# Patient Record
Sex: FEMALE | Race: WHITE | Employment: FULL TIME | URBAN - METROPOLITAN AREA
[De-identification: names, ages, dates, MRNs, and addresses within clinical notes are randomized per-mention and may not be internally consistent; named-entity substitution may affect disease eponyms.]

---

## 2017-11-06 PROCEDURE — 88175 CYTOPATH C/V AUTO FLUID REDO: CPT | Performed by: OBSTETRICS & GYNECOLOGY

## 2017-11-06 PROCEDURE — 87624 HPV HI-RISK TYP POOLED RSLT: CPT | Performed by: OBSTETRICS & GYNECOLOGY

## 2018-07-13 PROBLEM — J30.89 NON-SEASONAL ALLERGIC RHINITIS, UNSPECIFIED TRIGGER: Status: ACTIVE | Noted: 2018-07-13

## 2019-06-27 PROBLEM — M25.561 CHRONIC PAIN OF BOTH KNEES: Status: ACTIVE | Noted: 2019-06-27

## 2019-06-27 PROBLEM — M76.31 ILIOTIBIAL BAND SYNDROME OF RIGHT SIDE: Status: ACTIVE | Noted: 2019-06-27

## 2019-06-27 PROBLEM — G89.29 CHRONIC PAIN OF BOTH KNEES: Status: ACTIVE | Noted: 2019-06-27

## 2019-06-27 PROBLEM — M25.562 CHRONIC PAIN OF BOTH KNEES: Status: ACTIVE | Noted: 2019-06-27

## 2020-02-04 PROBLEM — D69.6 THROMBOCYTOPENIA: Status: ACTIVE | Noted: 2020-02-04

## 2020-02-04 PROBLEM — D69.6 THROMBOCYTOPENIA (HCC): Status: ACTIVE | Noted: 2020-02-04

## 2020-03-02 PROBLEM — O09.529 AMA (ADVANCED MATERNAL AGE) MULTIGRAVIDA 35+ (HCC): Status: ACTIVE | Noted: 2020-03-02

## 2020-03-02 PROBLEM — O09.529 AMA (ADVANCED MATERNAL AGE) MULTIGRAVIDA 35+: Status: ACTIVE | Noted: 2020-03-02

## 2020-05-06 NOTE — PROGRESS NOTES
Outpatient Maternal-Fetal Medicine Consultation    Dear Dr. Arianna Collazo    Thank you for requesting ultrasound evaluation and maternal fetal medicine consultation on your patient Singh Noble.   As you are aware she is a 47year old female [id number] with a si Take 1 capsule by mouth daily. , Disp: , Rfl:   •  Fexofenadine-Pseudoephed ER  MG Oral Tablet 12 Hr, Take 1 tablet by mouth 2 (two) times daily. , Disp: , Rfl:   •  Fluticasone Propionate 50 MCG/ACT Nasal Suspension, 2 sprays by Nasal route daily. Martha Bliss Amniotic fluid: normal. MVP 4.2 cm. Cord: normal.   Placenta: posterior. Fetal Anatomy:  Visualized with normal appearance: head, face, spine, neck, skin, chest, abdominal wall, gastrointestinal tract, kidneys, bladder, extremities, skeleton.     Br Routine obstetric care is more than adequate to assess for gestational diabetes and preeclampsia; hence, no further significant alterations in obstetric care are advised.     Fetal Aneuploidy    We also discussed the increased risk of chromosomal abnormalit 10:00 AM     07/12/2012 04:37 PM     12/03/2011 12:40 PM     Incidental thrombocytopenia of pregnancy, also termed gestational thrombocytopenia, is defined by the following five criteria:  Mild and asymptomatic thrombocytopenia   No past hist coordination of care. Our discussion is summarized above. The approximate physician face-to-face time was 40 minutes.

## 2020-05-07 ENCOUNTER — OFFICE VISIT (OUTPATIENT)
Dept: PERINATAL CARE | Facility: HOSPITAL | Age: 40
End: 2020-05-07
Attending: OBSTETRICS & GYNECOLOGY
Payer: COMMERCIAL

## 2020-05-07 VITALS
WEIGHT: 164 LBS | HEART RATE: 82 BPM | HEIGHT: 68 IN | DIASTOLIC BLOOD PRESSURE: 75 MMHG | BODY MASS INDEX: 24.86 KG/M2 | SYSTOLIC BLOOD PRESSURE: 122 MMHG

## 2020-05-07 DIAGNOSIS — D69.6 THROMBOCYTOPENIA (HCC): ICD-10-CM

## 2020-05-07 DIAGNOSIS — O09.521 MULTIGRAVIDA OF ADVANCED MATERNAL AGE IN FIRST TRIMESTER: ICD-10-CM

## 2020-05-07 PROCEDURE — 76811 OB US DETAILED SNGL FETUS: CPT | Performed by: OBSTETRICS & GYNECOLOGY

## 2020-05-07 PROCEDURE — 99243 OFF/OP CNSLTJ NEW/EST LOW 30: CPT | Performed by: OBSTETRICS & GYNECOLOGY

## 2020-07-23 ENCOUNTER — OFFICE VISIT (OUTPATIENT)
Dept: PERINATAL CARE | Facility: HOSPITAL | Age: 40
End: 2020-07-23
Attending: OBSTETRICS & GYNECOLOGY
Payer: COMMERCIAL

## 2020-07-23 VITALS
SYSTOLIC BLOOD PRESSURE: 112 MMHG | HEART RATE: 91 BPM | WEIGHT: 179 LBS | BODY MASS INDEX: 27 KG/M2 | DIASTOLIC BLOOD PRESSURE: 72 MMHG

## 2020-07-23 DIAGNOSIS — O09.523 MULTIGRAVIDA OF ADVANCED MATERNAL AGE IN THIRD TRIMESTER: ICD-10-CM

## 2020-07-23 DIAGNOSIS — D69.6 THROMBOCYTOPENIA (HCC): ICD-10-CM

## 2020-07-23 PROCEDURE — 99213 OFFICE O/P EST LOW 20 MIN: CPT | Performed by: OBSTETRICS & GYNECOLOGY

## 2020-07-23 PROCEDURE — 76816 OB US FOLLOW-UP PER FETUS: CPT | Performed by: OBSTETRICS & GYNECOLOGY

## 2020-07-23 NOTE — PROGRESS NOTES
Indication: Maternal age (47 years). ____________________________________________________________________________  History: Age: 47 years. Maternal age at [facility]: 47 years.  : 4 Para: 2.  _______________________________________________________________ (2). Score 8 / 8. Summary:  testing is reassuring.   We discussed fetal movement counts.    ____________________________________________________________________________  Comments:  Outpatient Maternal-Fetal Medicine Consultation    Dear Dr. Shweta Feliz capsule by mouth daily. , Disp: , Rfl:     Fexofenadine-Pseudoephed ER  MG Oral Tablet 12 Hr, Take 1 tablet by mouth 2 (two) times daily. , Disp: , Rfl:     Fluticasone Propionate 50 MCG/ACT Nasal Suspension, 2 sprays by Nasal route daily. , Disp: 3 Inh during a previous pregnancy)    Occurrence during late gestation    No association with fetal thrombocytopenia    Spontaneous resolution after delivery      IMPRESSION:  · IUP at 32w0d  · AMA: low-risk cell free fetal DNA, declined invasive testing  · Gest

## 2020-08-08 ENCOUNTER — HOSPITAL ENCOUNTER (OUTPATIENT)
Facility: HOSPITAL | Age: 40
Setting detail: OBSERVATION
Discharge: HOME | End: 2020-08-08
Attending: OBSTETRICS & GYNECOLOGY | Admitting: OBSTETRICS & GYNECOLOGY
Payer: COMMERCIAL

## 2020-08-08 VITALS
TEMPERATURE: 99 F | DIASTOLIC BLOOD PRESSURE: 72 MMHG | BODY MASS INDEX: 28.34 KG/M2 | RESPIRATION RATE: 18 BRPM | SYSTOLIC BLOOD PRESSURE: 118 MMHG | HEIGHT: 68 IN | WEIGHT: 187 LBS | HEART RATE: 95 BPM

## 2020-08-08 PROBLEM — Z34.90 PREGNANCY (HCC): Status: ACTIVE | Noted: 2020-08-08

## 2020-08-08 PROBLEM — Z34.90 PREGNANCY: Status: ACTIVE | Noted: 2020-08-08

## 2020-08-08 PROCEDURE — 99213 OFFICE O/P EST LOW 20 MIN: CPT

## 2020-08-08 PROCEDURE — 59025 FETAL NON-STRESS TEST: CPT

## 2020-08-08 NOTE — NST
Nonstress Test   Patient: Debra Dias    Gestation: 34w2d    NST:       Variability: Moderate           Accelerations: Yes           Decelerations: None            Baseline: 145 BPM           Uterine Irritability: No           Contractions: Bethena Benjamin

## 2020-08-08 NOTE — PROGRESS NOTES
Pt , edc 20 (34 2/7wks) admitted to triage rm 2 with c/o decreased fetal movement since yesterday morning. Pt states she overexerted herself yesterday and felt weak & sweaty.  Pt states she continues to feel fetal movement but definitely a lot less

## 2020-08-31 PROBLEM — O99.820 GBS (GROUP B STREPTOCOCCUS CARRIER), +RV CULTURE, CURRENTLY PREGNANT: Status: ACTIVE | Noted: 2020-08-31

## 2020-08-31 PROBLEM — O99.820 GBS (GROUP B STREPTOCOCCUS CARRIER), +RV CULTURE, CURRENTLY PREGNANT (HCC): Status: ACTIVE | Noted: 2020-08-31

## 2020-09-08 ENCOUNTER — TELEPHONE (OUTPATIENT)
Dept: OBGYN UNIT | Facility: HOSPITAL | Age: 40
End: 2020-09-08

## 2020-09-11 ENCOUNTER — APPOINTMENT (OUTPATIENT)
Dept: OBGYN CLINIC | Facility: HOSPITAL | Age: 40
End: 2020-09-11
Payer: COMMERCIAL

## 2020-09-11 ENCOUNTER — TELEPHONE (OUTPATIENT)
Dept: OBGYN UNIT | Facility: HOSPITAL | Age: 40
End: 2020-09-11

## 2020-09-11 ENCOUNTER — HOSPITAL ENCOUNTER (INPATIENT)
Facility: HOSPITAL | Age: 40
LOS: 2 days | Discharge: HOME | End: 2020-09-13
Attending: OBSTETRICS & GYNECOLOGY | Admitting: OBSTETRICS & GYNECOLOGY
Payer: COMMERCIAL

## 2020-09-11 ENCOUNTER — ANESTHESIA (OUTPATIENT)
Dept: OBGYN UNIT | Facility: HOSPITAL | Age: 40
End: 2020-09-11
Payer: COMMERCIAL

## 2020-09-11 ENCOUNTER — ANESTHESIA EVENT (OUTPATIENT)
Dept: OBGYN UNIT | Facility: HOSPITAL | Age: 40
End: 2020-09-11
Payer: COMMERCIAL

## 2020-09-11 LAB
ANTIBODY SCREEN: NEGATIVE
BASOPHILS # BLD AUTO: 0.01 X10(3) UL (ref 0–0.2)
BASOPHILS NFR BLD AUTO: 0.2 %
DEPRECATED RDW RBC AUTO: 41.9 FL (ref 35.1–46.3)
EOSINOPHIL # BLD AUTO: 0.06 X10(3) UL (ref 0–0.7)
EOSINOPHIL NFR BLD AUTO: 1 %
ERYTHROCYTE [DISTWIDTH] IN BLOOD BY AUTOMATED COUNT: 12.9 % (ref 11–15)
HCT VFR BLD AUTO: 34.4 % (ref 35–48)
HGB BLD-MCNC: 11.6 G/DL (ref 12–16)
IMM GRANULOCYTES # BLD AUTO: 0.04 X10(3) UL (ref 0–1)
IMM GRANULOCYTES NFR BLD: 0.7 %
LYMPHOCYTES # BLD AUTO: 0.89 X10(3) UL (ref 1–4)
LYMPHOCYTES NFR BLD AUTO: 14.6 %
MCH RBC QN AUTO: 30.2 PG (ref 26–34)
MCHC RBC AUTO-ENTMCNC: 33.7 G/DL (ref 31–37)
MCV RBC AUTO: 89.6 FL (ref 80–100)
MONOCYTES # BLD AUTO: 0.44 X10(3) UL (ref 0.1–1)
MONOCYTES NFR BLD AUTO: 7.2 %
NEUTROPHILS # BLD AUTO: 4.66 X10 (3) UL (ref 1.5–7.7)
NEUTROPHILS # BLD AUTO: 4.66 X10(3) UL (ref 1.5–7.7)
NEUTROPHILS NFR BLD AUTO: 76.3 %
PLATELET # BLD AUTO: 129 10(3)UL (ref 150–450)
RBC # BLD AUTO: 3.84 X10(6)UL (ref 3.8–5.3)
RH BLOOD TYPE: POSITIVE
SARS-COV-2 RNA RESP QL NAA+PROBE: NOT DETECTED
T PALLIDUM AB SER QL IA: NONREACTIVE
WBC # BLD AUTO: 6.1 X10(3) UL (ref 4–11)

## 2020-09-11 PROCEDURE — 86901 BLOOD TYPING SEROLOGIC RH(D): CPT | Performed by: OBSTETRICS & GYNECOLOGY

## 2020-09-11 PROCEDURE — 86780 TREPONEMA PALLIDUM: CPT | Performed by: OBSTETRICS & GYNECOLOGY

## 2020-09-11 PROCEDURE — 0HQ9XZZ REPAIR PERINEUM SKIN, EXTERNAL APPROACH: ICD-10-PCS | Performed by: OBSTETRICS & GYNECOLOGY

## 2020-09-11 PROCEDURE — 85025 COMPLETE CBC W/AUTO DIFF WBC: CPT | Performed by: OBSTETRICS & GYNECOLOGY

## 2020-09-11 PROCEDURE — 3E033VJ INTRODUCTION OF OTHER HORMONE INTO PERIPHERAL VEIN, PERCUTANEOUS APPROACH: ICD-10-PCS | Performed by: OBSTETRICS & GYNECOLOGY

## 2020-09-11 PROCEDURE — 86900 BLOOD TYPING SEROLOGIC ABO: CPT | Performed by: OBSTETRICS & GYNECOLOGY

## 2020-09-11 PROCEDURE — 86850 RBC ANTIBODY SCREEN: CPT | Performed by: OBSTETRICS & GYNECOLOGY

## 2020-09-11 RX ORDER — IBUPROFEN 600 MG/1
600 TABLET ORAL EVERY 6 HOURS
Status: DISCONTINUED | OUTPATIENT
Start: 2020-09-11 | End: 2020-09-13

## 2020-09-11 RX ORDER — ONDANSETRON 2 MG/ML
4 INJECTION INTRAMUSCULAR; INTRAVENOUS EVERY 6 HOURS PRN
Status: DISCONTINUED | OUTPATIENT
Start: 2020-09-11 | End: 2020-09-11

## 2020-09-11 RX ORDER — IBUPROFEN 600 MG/1
600 TABLET ORAL EVERY 6 HOURS PRN
Status: DISCONTINUED | OUTPATIENT
Start: 2020-09-11 | End: 2020-09-11

## 2020-09-11 RX ORDER — SODIUM CHLORIDE, SODIUM LACTATE, POTASSIUM CHLORIDE, CALCIUM CHLORIDE 600; 310; 30; 20 MG/100ML; MG/100ML; MG/100ML; MG/100ML
INJECTION, SOLUTION INTRAVENOUS CONTINUOUS
Status: DISCONTINUED | OUTPATIENT
Start: 2020-09-11 | End: 2020-09-11

## 2020-09-11 RX ORDER — DOCUSATE SODIUM 100 MG/1
100 CAPSULE, LIQUID FILLED ORAL
Status: DISCONTINUED | OUTPATIENT
Start: 2020-09-11 | End: 2020-09-13

## 2020-09-11 RX ORDER — ACETAMINOPHEN 325 MG/1
650 TABLET ORAL EVERY 6 HOURS PRN
Status: DISCONTINUED | OUTPATIENT
Start: 2020-09-11 | End: 2020-09-13

## 2020-09-11 RX ORDER — DEXTROSE, SODIUM CHLORIDE, SODIUM LACTATE, POTASSIUM CHLORIDE, AND CALCIUM CHLORIDE 5; .6; .31; .03; .02 G/100ML; G/100ML; G/100ML; G/100ML; G/100ML
INJECTION, SOLUTION INTRAVENOUS AS NEEDED
Status: DISCONTINUED | OUTPATIENT
Start: 2020-09-11 | End: 2020-09-11

## 2020-09-11 RX ORDER — TRISODIUM CITRATE DIHYDRATE AND CITRIC ACID MONOHYDRATE 500; 334 MG/5ML; MG/5ML
30 SOLUTION ORAL AS NEEDED
Status: DISCONTINUED | OUTPATIENT
Start: 2020-09-11 | End: 2020-09-11

## 2020-09-11 RX ORDER — CLINDAMYCIN PHOSPHATE 900 MG/50ML
900 INJECTION INTRAVENOUS EVERY 8 HOURS
Status: DISCONTINUED | OUTPATIENT
Start: 2020-09-11 | End: 2020-09-11

## 2020-09-11 RX ORDER — ACETAMINOPHEN 500 MG
500 TABLET ORAL EVERY 6 HOURS PRN
Status: DISCONTINUED | OUTPATIENT
Start: 2020-09-11 | End: 2020-09-11

## 2020-09-11 RX ORDER — AMMONIA INHALANTS 0.04 G/.3ML
0.3 INHALANT RESPIRATORY (INHALATION) AS NEEDED
Status: DISCONTINUED | OUTPATIENT
Start: 2020-09-11 | End: 2020-09-11

## 2020-09-11 RX ORDER — SIMETHICONE 80 MG
80 TABLET,CHEWABLE ORAL 3 TIMES DAILY PRN
Status: DISCONTINUED | OUTPATIENT
Start: 2020-09-11 | End: 2020-09-13

## 2020-09-11 RX ORDER — EPHEDRINE SULFATE/0.9% NACL/PF 25 MG/5 ML
5 SYRINGE (ML) INTRAVENOUS AS NEEDED
Status: DISCONTINUED | OUTPATIENT
Start: 2020-09-11 | End: 2020-09-11

## 2020-09-11 RX ORDER — TERBUTALINE SULFATE 1 MG/ML
0.25 INJECTION, SOLUTION SUBCUTANEOUS AS NEEDED
Status: DISCONTINUED | OUTPATIENT
Start: 2020-09-11 | End: 2020-09-11

## 2020-09-11 RX ORDER — DIPHENHYDRAMINE HYDROCHLORIDE 50 MG/ML
12.5 INJECTION INTRAMUSCULAR; INTRAVENOUS EVERY 4 HOURS PRN
Status: DISCONTINUED | OUTPATIENT
Start: 2020-09-11 | End: 2020-09-11

## 2020-09-11 RX ORDER — BISACODYL 10 MG
10 SUPPOSITORY, RECTAL RECTAL ONCE AS NEEDED
Status: DISCONTINUED | OUTPATIENT
Start: 2020-09-11 | End: 2020-09-13

## 2020-09-11 NOTE — ANESTHESIA PREPROCEDURE EVALUATION
PRE-OP EVALUATION    Patient Name: Deana Marc    Pre-op Diagnosis: * No pre-op diagnosis entered *    * No procedures listed *    * No surgeons found in log *    Pre-op vitals reviewed.   Temp: 98 °F (36.7 °C)  Pulse: 89  Resp: 16  BP: 131/79  SpO2: Clavulanate]; Biaxin [Clarithromycin]      Anesthesia Evaluation        Anesthetic Complications  (-) history of anesthetic complications         GI/Hepatic/Renal    Negative GI/hepatic/renal ROS.                              Cardiovascular    Negative card epidural           Comment: Plan for epidural for labor analgesia. Risks of infection, bleeding, post dural puncture headache, nerve injury, and inadequate analgesia requiring epidural placement discussed.  Pt voiced understanding and wishes to proceed  Nette

## 2020-09-11 NOTE — H&P
[address] and Delivery Prenatal History and Physical Interval Addendum  Please see full Prenatal Record for this pregnancy      SUBJECTIVE:    Interval History:      This is a pregnancy at 44 1/7 weeks admitted for Elective IOL 39 + weeks with

## 2020-09-11 NOTE — ANESTHESIA PROCEDURE NOTES
Labor Analgesia  Performed by: Merle Smith MD  Authorized by: Merle Smith MD       General Information and Staff    Start Time:  9/11/2020 4:18 PM  End Time:  9/11/2020 4:29 PM  Anesthesiologist:  Merle Smith MD  Performed

## 2020-09-11 NOTE — PROGRESS NOTES
Pt is a 47year old female admitted to 105/105-A. Patient presents with:  Scheduled Induction     Pt is [id number] 39w1d intra-uterine pregnancy. History obtained, consents signed. Oriented to room, staff, and plan of care.

## 2020-09-12 LAB
BASOPHILS # BLD AUTO: 0.02 X10(3) UL (ref 0–0.2)
BASOPHILS NFR BLD AUTO: 0.3 %
DEPRECATED RDW RBC AUTO: 44.5 FL (ref 35.1–46.3)
EOSINOPHIL # BLD AUTO: 0.03 X10(3) UL (ref 0–0.7)
EOSINOPHIL NFR BLD AUTO: 0.4 %
ERYTHROCYTE [DISTWIDTH] IN BLOOD BY AUTOMATED COUNT: 12.9 % (ref 11–15)
HCT VFR BLD AUTO: 33.5 % (ref 35–48)
HGB BLD-MCNC: 10.9 G/DL (ref 12–16)
IMM GRANULOCYTES # BLD AUTO: 0.06 X10(3) UL (ref 0–1)
IMM GRANULOCYTES NFR BLD: 0.8 %
LYMPHOCYTES # BLD AUTO: 0.91 X10(3) UL (ref 1–4)
LYMPHOCYTES NFR BLD AUTO: 12.6 %
MCH RBC QN AUTO: 30.8 PG (ref 26–34)
MCHC RBC AUTO-ENTMCNC: 32.5 G/DL (ref 31–37)
MCV RBC AUTO: 94.6 FL (ref 80–100)
MONOCYTES # BLD AUTO: 0.44 X10(3) UL (ref 0.1–1)
MONOCYTES NFR BLD AUTO: 6.1 %
NEUTROPHILS # BLD AUTO: 5.78 X10 (3) UL (ref 1.5–7.7)
NEUTROPHILS # BLD AUTO: 5.78 X10(3) UL (ref 1.5–7.7)
NEUTROPHILS NFR BLD AUTO: 79.8 %
PLATELET # BLD AUTO: 130 10(3)UL (ref 150–450)
RBC # BLD AUTO: 3.54 X10(6)UL (ref 3.8–5.3)
WBC # BLD AUTO: 7.2 X10(3) UL (ref 4–11)

## 2020-09-12 PROCEDURE — 85025 COMPLETE CBC W/AUTO DIFF WBC: CPT | Performed by: OBSTETRICS & GYNECOLOGY

## 2020-09-12 NOTE — PROGRESS NOTES
NURSING ADMISSION NOTE      Patient admitted via Wheelchair  Oriented to room. Safety precautions initiated. Bed in low position. Call light in reach.   Identification of  verified with parents and RNx2  Hugs/kisses in place  Discharge folder

## 2020-09-12 NOTE — PROGRESS NOTES
S: pt without complaints.   Lochia light  O: VS-Temp:  [97.9 °F (36.6 °C)-98.2 °F (36.8 °C)] 98.2 °F (36.8 °C)  Pulse:  [56-89] 70  Resp:  [16-20] 16  BP: (0-153)/(0-98) 109/58       Abdomen- soft ND NT, uterus firm and contracted       Extremities- tr ara

## 2020-09-12 NOTE — PLAN OF CARE
Problem: Patient/Family Goals  Goal: Patient/Family Long Term Goal  Description  Patient's Long Term Goal:   Uncomplicated vaginal delivery    Interventions:  VS per protocol  I&O  Ice chips and sips as tolerated  EFM per protocol  Maintain IV as ordered ice/sitz baths for perineum discomfort. - Monitor healing of incision/episiotomy/laceration, and assess for signs and symptoms of infection and hematoma.   - Assess bladder function and monitor for bladder distention.  - Provide/instruct/assist with perica and assistance, as needed. - Encourage rooming-in and breast feeding on demand.  - Encourage skin-to-skin contact. - Provide LC support as needed. - Assess for and manage engorgement.   - Provide breast feeding education handouts and information on commu

## 2020-09-12 NOTE — PROGRESS NOTES
BRP with assist, tolerated well, denies urge to void. pericare done. Pt transferred per wheelchair with baby in arms. Report given to [facility]. Proper ID done with two RNs.

## 2020-09-12 NOTE — PLAN OF CARE
Problem: Patient/Family Goals  Goal: Patient/Family Long Term Goal  Description  Patient's Long Term Goal:   Uncomplicated vaginal delivery    Interventions:  VS per protocol  I&O  Ice chips and sips as tolerated  EFM per protocol  Maintain IV as ordered ANXIETY  Goal: Will report anxiety at manageable levels  Description  INTERVENTIONS:  - Administer medication as ordered  - Teach and rehearse alternative coping skills  - Provide emotional support with 1:1 interaction with staff  Outcome: Progressing actively listen. - Educate father/SO about benefits of breast feeding and how to manage common lactation challenges.   - Recommend avoidance of specific medications or substances incompatible with breast feeding.  - Assess and monitor for signs of nipple p

## 2020-09-12 NOTE — PROGRESS NOTES
Labor Analgesia Follow Up Note    Patient underwent epidural anesthesia for labor analgesia,    Placenta Date/Time: 9/11/2020  7:47 PM    Delivery Date/Time[de-identified] 9/11/2020  7:45 PM    /65 (BP Location: Left arm)   Pulse 73   Temp 97.7 °F (36.5 °C) (Oral

## 2020-09-12 NOTE — L&D DELIVERY NOTE
Eldridge Williams [[id number]]    Labor Events     labor?:  No   steroids?:  None  Antibiotics received during labor?:  Yes  Antibiotics (enter # doses in comment):  other  Rupture date/time:  2020 1340     Rupture type:  AROM  Fluid color:

## 2020-09-13 VITALS
TEMPERATURE: 99 F | HEART RATE: 63 BPM | OXYGEN SATURATION: 100 % | WEIGHT: 203 LBS | HEIGHT: 68 IN | RESPIRATION RATE: 16 BRPM | BODY MASS INDEX: 30.77 KG/M2 | DIASTOLIC BLOOD PRESSURE: 70 MMHG | SYSTOLIC BLOOD PRESSURE: 121 MMHG

## 2020-09-13 PROBLEM — O99.820 GBS (GROUP B STREPTOCOCCUS CARRIER), +RV CULTURE, CURRENTLY PREGNANT: Status: RESOLVED | Noted: 2020-08-31 | Resolved: 2020-09-13

## 2020-09-13 PROBLEM — O99.820 GBS (GROUP B STREPTOCOCCUS CARRIER), +RV CULTURE, CURRENTLY PREGNANT (HCC): Status: RESOLVED | Noted: 2020-08-31 | Resolved: 2020-09-13

## 2020-09-13 PROBLEM — Z34.90 PREGNANCY: Status: RESOLVED | Noted: 2020-08-08 | Resolved: 2020-09-13

## 2020-09-13 PROBLEM — O09.529 AMA (ADVANCED MATERNAL AGE) MULTIGRAVIDA 35+: Status: RESOLVED | Noted: 2020-03-02 | Resolved: 2020-09-13

## 2020-09-13 PROBLEM — O09.529 AMA (ADVANCED MATERNAL AGE) MULTIGRAVIDA 35+ (HCC): Status: RESOLVED | Noted: 2020-03-02 | Resolved: 2020-09-13

## 2020-09-13 PROBLEM — Z34.90 PREGNANCY (HCC): Status: RESOLVED | Noted: 2020-08-08 | Resolved: 2020-09-13

## 2020-09-13 NOTE — PROGRESS NOTES
S: pt without complaints.   Lochia light  O: VS-Temp:  [98 °F (36.7 °C)] 98 °F (36.7 °C)  Pulse:  [63-64] 63  Resp:  [16] 16  BP: (116-121)/(70) 121/70       Abdomen- soft ND NT, uterus firm and contracted       Extremities- 1+ edema, NT bilateral lower ext

## 2020-09-15 ENCOUNTER — TELEPHONE (OUTPATIENT)
Dept: OBGYN UNIT | Facility: HOSPITAL | Age: 40
End: 2020-09-15

## 2020-09-17 ENCOUNTER — APPOINTMENT (OUTPATIENT)
Dept: CT IMAGING | Facility: HOSPITAL | Age: 40
DRG: 776 | End: 2020-09-17
Attending: EMERGENCY MEDICINE
Payer: COMMERCIAL

## 2020-09-17 ENCOUNTER — HOSPITAL ENCOUNTER (INPATIENT)
Facility: HOSPITAL | Age: 40
LOS: 2 days | Discharge: HOME | DRG: 776 | End: 2020-09-19
Attending: EMERGENCY MEDICINE | Admitting: OBSTETRICS & GYNECOLOGY
Payer: COMMERCIAL

## 2020-09-17 ENCOUNTER — APPOINTMENT (OUTPATIENT)
Dept: GENERAL RADIOLOGY | Facility: HOSPITAL | Age: 40
DRG: 776 | End: 2020-09-17
Attending: EMERGENCY MEDICINE
Payer: COMMERCIAL

## 2020-09-17 DIAGNOSIS — R06.00 DYSPNEA, UNSPECIFIED TYPE: ICD-10-CM

## 2020-09-17 DIAGNOSIS — R77.8 ELEVATED TROPONIN: ICD-10-CM

## 2020-09-17 DIAGNOSIS — O15.00: Primary | ICD-10-CM

## 2020-09-17 PROBLEM — Z34.90 PREGNANCY: Status: ACTIVE | Noted: 2020-09-17

## 2020-09-17 PROBLEM — Z34.90 PREGNANCY (HCC): Status: ACTIVE | Noted: 2020-09-17

## 2020-09-17 LAB
ALBUMIN SERPL-MCNC: 3 G/DL (ref 3.4–5)
ALBUMIN/GLOB SERPL: 0.9 {RATIO} (ref 1–2)
ALP LIVER SERPL-CCNC: 156 U/L (ref 37–98)
ALT SERPL-CCNC: 84 U/L (ref 13–56)
ANION GAP SERPL CALC-SCNC: 6 MMOL/L (ref 0–18)
APTT PPP: 28.6 SECONDS (ref 25.4–36.1)
AST SERPL-CCNC: 52 U/L (ref 15–37)
BASOPHILS # BLD AUTO: 0.03 X10(3) UL (ref 0–0.2)
BASOPHILS NFR BLD AUTO: 0.5 %
BILIRUB SERPL-MCNC: 0.4 MG/DL (ref 0.1–2)
BILIRUB UR QL STRIP.AUTO: NEGATIVE
BUN BLD-MCNC: 15 MG/DL (ref 7–18)
BUN/CREAT SERPL: 16.9 (ref 10–20)
CALCIUM BLD-MCNC: 9.2 MG/DL (ref 8.5–10.1)
CHLORIDE SERPL-SCNC: 108 MMOL/L (ref 98–112)
CLARITY UR REFRACT.AUTO: CLEAR
CO2 SERPL-SCNC: 27 MMOL/L (ref 21–32)
CREAT BLD-MCNC: 0.89 MG/DL (ref 0.55–1.02)
DEPRECATED RDW RBC AUTO: 43.2 FL (ref 35.1–46.3)
EOSINOPHIL # BLD AUTO: 0.06 X10(3) UL (ref 0–0.7)
EOSINOPHIL NFR BLD AUTO: 0.9 %
ERYTHROCYTE [DISTWIDTH] IN BLOOD BY AUTOMATED COUNT: 12.6 % (ref 11–15)
GLOBULIN PLAS-MCNC: 3.5 G/DL (ref 2.8–4.4)
GLUCOSE BLD-MCNC: 84 MG/DL (ref 70–99)
GLUCOSE UR STRIP.AUTO-MCNC: NEGATIVE MG/DL
HCT VFR BLD AUTO: 37.8 % (ref 35–48)
HGB BLD-MCNC: 12.4 G/DL (ref 12–16)
IMM GRANULOCYTES # BLD AUTO: 0.06 X10(3) UL (ref 0–1)
IMM GRANULOCYTES NFR BLD: 0.9 %
INR BLD: 0.94 (ref 0.89–1.11)
KETONES UR STRIP.AUTO-MCNC: NEGATIVE MG/DL
LEUKOCYTE ESTERASE UR QL STRIP.AUTO: NEGATIVE
LYMPHOCYTES # BLD AUTO: 0.9 X10(3) UL (ref 1–4)
LYMPHOCYTES NFR BLD AUTO: 13.7 %
M PROTEIN MFR SERPL ELPH: 6.5 G/DL (ref 6.4–8.2)
MCH RBC QN AUTO: 30.5 PG (ref 26–34)
MCHC RBC AUTO-ENTMCNC: 32.8 G/DL (ref 31–37)
MCV RBC AUTO: 93.1 FL (ref 80–100)
MONOCYTES # BLD AUTO: 0.36 X10(3) UL (ref 0.1–1)
MONOCYTES NFR BLD AUTO: 5.5 %
NEUTROPHILS # BLD AUTO: 5.16 X10 (3) UL (ref 1.5–7.7)
NEUTROPHILS # BLD AUTO: 5.16 X10(3) UL (ref 1.5–7.7)
NEUTROPHILS NFR BLD AUTO: 78.5 %
NITRITE UR QL STRIP.AUTO: NEGATIVE
NT-PROBNP SERPL-MCNC: 3722 PG/ML (ref ?–125)
OSMOLALITY SERPL CALC.SUM OF ELEC: 292 MOSM/KG (ref 275–295)
PH UR STRIP.AUTO: 8 [PH] (ref 4.5–8)
PLATELET # BLD AUTO: 204 10(3)UL (ref 150–450)
POTASSIUM SERPL-SCNC: 3.6 MMOL/L (ref 3.5–5.1)
PROT UR STRIP.AUTO-MCNC: NEGATIVE MG/DL
PSA SERPL DL<=0.01 NG/ML-MCNC: 12.9 SECONDS (ref 12.4–14.6)
RBC # BLD AUTO: 4.06 X10(6)UL (ref 3.8–5.3)
SARS-COV-2 BY PCR: NEGATIVE
SODIUM SERPL-SCNC: 141 MMOL/L (ref 136–145)
SP GR UR STRIP.AUTO: <1.005 (ref 1–1.03)
TROPONIN I SERPL-MCNC: 0.24 NG/ML (ref ?–0.04)
URATE UR-MCNC: 12.1 MG/DL
UROBILINOGEN UR STRIP.AUTO-MCNC: <2 MG/DL
WBC # BLD AUTO: 6.6 X10(3) UL (ref 4–11)

## 2020-09-17 PROCEDURE — 93010 ELECTROCARDIOGRAM REPORT: CPT

## 2020-09-17 PROCEDURE — 71275 CT ANGIOGRAPHY CHEST: CPT | Performed by: EMERGENCY MEDICINE

## 2020-09-17 PROCEDURE — 80053 COMPREHEN METABOLIC PANEL: CPT | Performed by: EMERGENCY MEDICINE

## 2020-09-17 PROCEDURE — 99285 EMERGENCY DEPT VISIT HI MDM: CPT

## 2020-09-17 PROCEDURE — 70450 CT HEAD/BRAIN W/O DYE: CPT | Performed by: EMERGENCY MEDICINE

## 2020-09-17 PROCEDURE — 85025 COMPLETE CBC W/AUTO DIFF WBC: CPT | Performed by: EMERGENCY MEDICINE

## 2020-09-17 PROCEDURE — 85610 PROTHROMBIN TIME: CPT | Performed by: EMERGENCY MEDICINE

## 2020-09-17 PROCEDURE — 81001 URINALYSIS AUTO W/SCOPE: CPT | Performed by: EMERGENCY MEDICINE

## 2020-09-17 PROCEDURE — 84484 ASSAY OF TROPONIN QUANT: CPT | Performed by: EMERGENCY MEDICINE

## 2020-09-17 PROCEDURE — 36415 COLL VENOUS BLD VENIPUNCTURE: CPT

## 2020-09-17 PROCEDURE — 84560 ASSAY OF URINE/URIC ACID: CPT | Performed by: EMERGENCY MEDICINE

## 2020-09-17 PROCEDURE — 71045 X-RAY EXAM CHEST 1 VIEW: CPT | Performed by: EMERGENCY MEDICINE

## 2020-09-17 PROCEDURE — 85730 THROMBOPLASTIN TIME PARTIAL: CPT | Performed by: EMERGENCY MEDICINE

## 2020-09-17 PROCEDURE — 83880 ASSAY OF NATRIURETIC PEPTIDE: CPT | Performed by: EMERGENCY MEDICINE

## 2020-09-17 PROCEDURE — 93005 ELECTROCARDIOGRAM TRACING: CPT

## 2020-09-17 RX ORDER — CALCIUM CARBONATE 200(500)MG
1000 TABLET,CHEWABLE ORAL
Status: DISCONTINUED | OUTPATIENT
Start: 2020-09-17 | End: 2020-09-19

## 2020-09-17 RX ORDER — ZOLPIDEM TARTRATE 5 MG/1
5 TABLET ORAL NIGHTLY PRN
Status: DISCONTINUED | OUTPATIENT
Start: 2020-09-17 | End: 2020-09-19

## 2020-09-17 RX ORDER — ACETAMINOPHEN 500 MG
500 TABLET ORAL EVERY 6 HOURS PRN
Status: DISCONTINUED | OUTPATIENT
Start: 2020-09-17 | End: 2020-09-17

## 2020-09-17 RX ORDER — FUROSEMIDE 10 MG/ML
20 INJECTION INTRAMUSCULAR; INTRAVENOUS ONCE
Status: COMPLETED | OUTPATIENT
Start: 2020-09-17 | End: 2020-09-17

## 2020-09-17 RX ORDER — ACETAMINOPHEN 500 MG
500 TABLET ORAL EVERY 6 HOURS PRN
Status: DISCONTINUED | OUTPATIENT
Start: 2020-09-17 | End: 2020-09-19

## 2020-09-17 RX ORDER — DOCUSATE SODIUM 100 MG/1
100 CAPSULE, LIQUID FILLED ORAL 2 TIMES DAILY
Status: DISCONTINUED | OUTPATIENT
Start: 2020-09-17 | End: 2020-09-19

## 2020-09-17 RX ORDER — SODIUM CHLORIDE, SODIUM LACTATE, POTASSIUM CHLORIDE, CALCIUM CHLORIDE 600; 310; 30; 20 MG/100ML; MG/100ML; MG/100ML; MG/100ML
INJECTION, SOLUTION INTRAVENOUS CONTINUOUS
Status: DISCONTINUED | OUTPATIENT
Start: 2020-09-17 | End: 2020-09-18

## 2020-09-17 RX ORDER — ZOLPIDEM TARTRATE 5 MG/1
5 TABLET ORAL NIGHTLY PRN
Status: DISCONTINUED | OUTPATIENT
Start: 2020-09-17 | End: 2020-09-17

## 2020-09-17 RX ORDER — CALCIUM GLUCONATE 94 MG/ML
1 INJECTION, SOLUTION INTRAVENOUS ONCE AS NEEDED
Status: DISCONTINUED | OUTPATIENT
Start: 2020-09-17 | End: 2020-09-17

## 2020-09-17 RX ORDER — ACETAMINOPHEN 500 MG
1000 TABLET ORAL EVERY 6 HOURS PRN
Status: DISCONTINUED | OUTPATIENT
Start: 2020-09-17 | End: 2020-09-17

## 2020-09-17 RX ORDER — CALCIUM CARBONATE 200(500)MG
1000 TABLET,CHEWABLE ORAL
Status: DISCONTINUED | OUTPATIENT
Start: 2020-09-17 | End: 2020-09-17

## 2020-09-17 RX ORDER — CALCIUM GLUCONATE 94 MG/ML
1 INJECTION, SOLUTION INTRAVENOUS ONCE AS NEEDED
Status: DISCONTINUED | OUTPATIENT
Start: 2020-09-17 | End: 2020-09-19

## 2020-09-17 RX ORDER — IBUPROFEN 400 MG/1
400 TABLET ORAL EVERY 6 HOURS PRN
Status: DISCONTINUED | OUTPATIENT
Start: 2020-09-17 | End: 2020-09-18

## 2020-09-17 RX ORDER — ACETAMINOPHEN 500 MG
1000 TABLET ORAL EVERY 6 HOURS PRN
Status: DISCONTINUED | OUTPATIENT
Start: 2020-09-17 | End: 2020-09-19

## 2020-09-17 RX ORDER — DOCUSATE SODIUM 100 MG/1
100 CAPSULE, LIQUID FILLED ORAL 2 TIMES DAILY
Status: DISCONTINUED | OUTPATIENT
Start: 2020-09-17 | End: 2020-09-17

## 2020-09-17 NOTE — H&P
SUBJECTIVE:  Reason for Admission: PP Pre eclampsia.   PAtient had uncomplicated  6891 no h/o PIH  Last night started with HA, continued today with Pedal edema, went to ER, elevated BP, HA and elevated LFT        prenatal multivitamin plus DHA 27 Social History  Social History    Tobacco Use      Smoking status: Former Smoker        Quit date: 1/3/2012        Years since quittin.7      Smokeless tobacco: Never Used    Alcohol use: Not Currently      Alcohol/week: 1.0 standard drinks      Ty

## 2020-09-17 NOTE — ED NOTES
Spoke with OB charge Rose Benitez.  Patient will go to CT and then directly to L and D post partum room 4623

## 2020-09-17 NOTE — ED INITIAL ASSESSMENT (HPI)
Delivered baby last friday. Monday feeling SOB, continues with movement and talking. COVID 19 test negative at . HTN at [address] [place] [address]. 158/87.  bringing patient in.

## 2020-09-18 LAB
ALBUMIN SERPL-MCNC: 3 G/DL (ref 3.4–5)
ALBUMIN/GLOB SERPL: 0.8 {RATIO} (ref 1–2)
ALP LIVER SERPL-CCNC: 170 U/L (ref 37–98)
ALT SERPL-CCNC: 75 U/L (ref 13–56)
ANION GAP SERPL CALC-SCNC: 4 MMOL/L (ref 0–18)
AST SERPL-CCNC: 35 U/L (ref 15–37)
ATRIAL RATE: 64 BPM
BASOPHILS # BLD AUTO: 0.03 X10(3) UL (ref 0–0.2)
BASOPHILS NFR BLD AUTO: 0.4 %
BILIRUB SERPL-MCNC: 0.5 MG/DL (ref 0.1–2)
BUN BLD-MCNC: 11 MG/DL (ref 7–18)
BUN/CREAT SERPL: 11 (ref 10–20)
CALCIUM BLD-MCNC: 7.5 MG/DL (ref 8.5–10.1)
CHLORIDE SERPL-SCNC: 100 MMOL/L (ref 98–112)
CO2 SERPL-SCNC: 33 MMOL/L (ref 21–32)
CREAT BLD-MCNC: 1 MG/DL (ref 0.55–1.02)
DEPRECATED RDW RBC AUTO: 43 FL (ref 35.1–46.3)
EOSINOPHIL # BLD AUTO: 0.05 X10(3) UL (ref 0–0.7)
EOSINOPHIL NFR BLD AUTO: 0.7 %
ERYTHROCYTE [DISTWIDTH] IN BLOOD BY AUTOMATED COUNT: 12.8 % (ref 11–15)
GLOBULIN PLAS-MCNC: 3.9 G/DL (ref 2.8–4.4)
GLUCOSE BLD-MCNC: 102 MG/DL (ref 70–99)
HCT VFR BLD AUTO: 43.5 % (ref 35–48)
HGB BLD-MCNC: 14.2 G/DL (ref 12–16)
IMM GRANULOCYTES # BLD AUTO: 0.03 X10(3) UL (ref 0–1)
IMM GRANULOCYTES NFR BLD: 0.4 %
LYMPHOCYTES # BLD AUTO: 0.74 X10(3) UL (ref 1–4)
LYMPHOCYTES NFR BLD AUTO: 10.5 %
M PROTEIN MFR SERPL ELPH: 6.9 G/DL (ref 6.4–8.2)
MCH RBC QN AUTO: 30 PG (ref 26–34)
MCHC RBC AUTO-ENTMCNC: 32.6 G/DL (ref 31–37)
MCV RBC AUTO: 91.8 FL (ref 80–100)
MONOCYTES # BLD AUTO: 0.53 X10(3) UL (ref 0.1–1)
MONOCYTES NFR BLD AUTO: 7.5 %
NEUTROPHILS # BLD AUTO: 5.7 X10 (3) UL (ref 1.5–7.7)
NEUTROPHILS # BLD AUTO: 5.7 X10(3) UL (ref 1.5–7.7)
NEUTROPHILS NFR BLD AUTO: 80.5 %
OSMOLALITY SERPL CALC.SUM OF ELEC: 284 MOSM/KG (ref 275–295)
P AXIS: 57 DEGREES
P-R INTERVAL: 120 MS
PLATELET # BLD AUTO: 235 10(3)UL (ref 150–450)
POTASSIUM SERPL-SCNC: 3.7 MMOL/L (ref 3.5–5.1)
Q-T INTERVAL: 454 MS
QRS DURATION: 80 MS
QTC CALCULATION (BEZET): 468 MS
R AXIS: 60 DEGREES
RBC # BLD AUTO: 4.74 X10(6)UL (ref 3.8–5.3)
SODIUM SERPL-SCNC: 137 MMOL/L (ref 136–145)
T AXIS: 50 DEGREES
URATE SERPL-MCNC: 6.4 MG/DL (ref 2.6–6)
VENTRICULAR RATE: 64 BPM
WBC # BLD AUTO: 7.1 X10(3) UL (ref 4–11)

## 2020-09-18 PROCEDURE — 85025 COMPLETE CBC W/AUTO DIFF WBC: CPT | Performed by: OBSTETRICS & GYNECOLOGY

## 2020-09-18 PROCEDURE — 84550 ASSAY OF BLOOD/URIC ACID: CPT | Performed by: OBSTETRICS & GYNECOLOGY

## 2020-09-18 PROCEDURE — 80053 COMPREHEN METABOLIC PANEL: CPT | Performed by: OBSTETRICS & GYNECOLOGY

## 2020-09-18 RX ORDER — IBUPROFEN 600 MG/1
600 TABLET ORAL EVERY 6 HOURS PRN
Status: DISCONTINUED | OUTPATIENT
Start: 2020-09-18 | End: 2020-09-19

## 2020-09-18 RX ORDER — LABETALOL 200 MG/1
200 TABLET, FILM COATED ORAL 2 TIMES DAILY
Status: DISCONTINUED | OUTPATIENT
Start: 2020-09-18 | End: 2020-09-18

## 2020-09-18 RX ORDER — HYDROCODONE BITARTRATE AND ACETAMINOPHEN 5; 325 MG/1; MG/1
1 TABLET ORAL EVERY 6 HOURS PRN
Status: DISCONTINUED | OUTPATIENT
Start: 2020-09-18 | End: 2020-09-19

## 2020-09-18 RX ORDER — LABETALOL 200 MG/1
200 TABLET, FILM COATED ORAL 2 TIMES DAILY
Status: DISCONTINUED | OUTPATIENT
Start: 2020-09-18 | End: 2020-09-19

## 2020-09-18 NOTE — PROGRESS NOTES
Pt up to bathroom with assist x 2. Pt tolerated activity without difficulty, discomfort or dyspnea. Logan dc'd, pericare completed. Scant dark brown discharge noted on peripad.

## 2020-09-18 NOTE — PLAN OF CARE
Problem: Patient/Family Goals  Goal: Patient/Family Short Term Goal  Description  Patient's Short Term Goal: maintain blood pressures    Interventions:   -   Problem: Patient/Family Goals  Goal: Patient/Family Long Term Goal  Description  Patient's Long

## 2020-09-18 NOTE — PAYOR COMM NOTE
--------------  ADMISSION REVIEW     Payor: KEVIN LOERA  Subscriber #:  [id number]  Authorization Number: [id number]    Admit date: 9/17/20  Admit time: 9452       Patient Seen in: [facility] 1sw-j    History   Patient presents with:  Dyspnea ANTHONY SOB Awake oriented ×3. Nonfocal.  Good strength throughout.   No meningismus    Labs Reviewed   COMP METABOLIC PANEL (14) - Abnormal; Notable for the following components:       Result Value    AST 52 (*)     ALT 84 (*)     Alkaline Phosphatase 156 (*)     Alb (primary encounter diagnosis)  Dyspnea, unspecified type  Elevated troponin         H&P         SUBJECTIVE:  Reason for Admission: PP Pre eclampsia.   PAtient had uncomplicated   no h/o PIH  Last night started with HA, continued today with Peda Component Value Date    WBC 6.6 09/17/2020    HGB 12.4 09/17/2020    HCT 37.8 09/17/2020    .0 09/17/2020    CREATSERUM 0.89 09/17/2020    BUN 15 09/17/2020     09/17/2020    K 3.6 09/17/2020     09/17/2020    CO2 27.0 09/17/2020    GL

## 2020-09-18 NOTE — ED PROVIDER NOTES
Patient Seen in: [facility] 1sw-j      History   Patient presents with:  Dyspnea ANTHONY SOB    Stated Complaint: Delivered baby last friday.  Monday feeling SOB, continues with movement and galo*    HPI    Patient is a 53-year-old woman who presents the Drug use: No             Review of Systems    Positive for stated complaint: Delivered baby last friday. Monday feeling SOB, continues with movement and galo*  Other systems are as noted in HPI. Constitutional and vital signs reviewed.       All other syste within normal limits   CBC W/ DIFFERENTIAL - Abnormal; Notable for the following components:    Lymphocyte Absolute 0.90 (*)     All other components within normal limits   PROTHROMBIN TIME (PT) - Normal   PTT, ACTIVATED - Normal   CBC WITH DIFFERENTIAL WI type  Elevated troponin    Disposition:  Send to l&d  9/17/2020  5:57 pm    Follow-up:  No follow-up provider specified.         Medications Prescribed:  Current Discharge Medication List

## 2020-09-18 NOTE — PROGRESS NOTES
Pt is a  at 6 days postpartum ( on ) who is admitted to room 1107 for postpartum magnesium sulfate. Pt presented to ER with new onset of HA, and SOB.  Increased Bp's and elevated liver enzymes were noted and after drawing labs and a CTscan, pt w

## 2020-09-18 NOTE — PROGRESS NOTES
OB/GYN    Pt without complaints. Headache resolved. Upset because baby not allowed to stay.       Pulse: 82  Resp: 15  BP: 126/82  abd  soft, NT, ND, fundus firm below umbilicus  extr  trace edema, no calf tenderness    Impression: Postpartum preeclampsia

## 2020-09-18 NOTE — PROGRESS NOTES
GIBBS resolved  LFT's improved but one still abnormal  [id number]    Plan Labetalol 200 now and bid  CMP in AM  Home tomorrow if BP under control and labs normalizing

## 2020-09-18 NOTE — PROGRESS NOTES
Pt resting in bed with infant in arms and  at bedside. POC discussed and questions answered. Dr. Steel Alpha on unit and aware of pt status.

## 2020-09-19 VITALS
DIASTOLIC BLOOD PRESSURE: 65 MMHG | RESPIRATION RATE: 16 BRPM | TEMPERATURE: 98 F | HEIGHT: 68 IN | WEIGHT: 190 LBS | OXYGEN SATURATION: 98 % | BODY MASS INDEX: 28.79 KG/M2 | HEART RATE: 96 BPM | SYSTOLIC BLOOD PRESSURE: 104 MMHG

## 2020-09-19 LAB
ALBUMIN SERPL-MCNC: 2.9 G/DL (ref 3.4–5)
ALBUMIN/GLOB SERPL: 0.9 {RATIO} (ref 1–2)
ALP LIVER SERPL-CCNC: 146 U/L
ALT SERPL-CCNC: 57 U/L
ANION GAP SERPL CALC-SCNC: 5 MMOL/L (ref 0–18)
AST SERPL-CCNC: 19 U/L (ref 15–37)
BILIRUB SERPL-MCNC: 0.4 MG/DL (ref 0.1–2)
BILIRUB UR QL STRIP.AUTO: NEGATIVE
BUN BLD-MCNC: 12 MG/DL (ref 7–18)
BUN/CREAT SERPL: 11.9 (ref 10–20)
CALCIUM BLD-MCNC: 8.3 MG/DL (ref 8.5–10.1)
CHLORIDE SERPL-SCNC: 103 MMOL/L (ref 98–112)
CLARITY UR REFRACT.AUTO: CLEAR
CO2 SERPL-SCNC: 29 MMOL/L (ref 21–32)
COLOR UR AUTO: YELLOW
CREAT BLD-MCNC: 1.01 MG/DL
GLOBULIN PLAS-MCNC: 3.4 G/DL (ref 2.8–4.4)
GLUCOSE BLD-MCNC: 97 MG/DL (ref 70–99)
GLUCOSE UR STRIP.AUTO-MCNC: NEGATIVE MG/DL
KETONES UR STRIP.AUTO-MCNC: NEGATIVE MG/DL
M PROTEIN MFR SERPL ELPH: 6.3 G/DL (ref 6.4–8.2)
NITRITE UR QL STRIP.AUTO: NEGATIVE
OSMOLALITY SERPL CALC.SUM OF ELEC: 284 MOSM/KG (ref 275–295)
PH UR STRIP.AUTO: 7 [PH] (ref 4.5–8)
POTASSIUM SERPL-SCNC: 3.7 MMOL/L (ref 3.5–5.1)
PROT UR STRIP.AUTO-MCNC: NEGATIVE MG/DL
SODIUM SERPL-SCNC: 137 MMOL/L (ref 136–145)
SP GR UR STRIP.AUTO: <1.005 (ref 1–1.03)
UROBILINOGEN UR STRIP.AUTO-MCNC: <2 MG/DL

## 2020-09-19 PROCEDURE — 87086 URINE CULTURE/COLONY COUNT: CPT | Performed by: OBSTETRICS & GYNECOLOGY

## 2020-09-19 PROCEDURE — 87186 SC STD MICRODIL/AGAR DIL: CPT | Performed by: OBSTETRICS & GYNECOLOGY

## 2020-09-19 PROCEDURE — 87088 URINE BACTERIA CULTURE: CPT | Performed by: OBSTETRICS & GYNECOLOGY

## 2020-09-19 PROCEDURE — 80053 COMPREHEN METABOLIC PANEL: CPT | Performed by: OBSTETRICS & GYNECOLOGY

## 2020-09-19 PROCEDURE — 81001 URINALYSIS AUTO W/SCOPE: CPT | Performed by: OBSTETRICS & GYNECOLOGY

## 2020-09-19 RX ORDER — NITROFURANTOIN 25; 75 MG/1; MG/1
100 CAPSULE ORAL 2 TIMES DAILY
Qty: 14 CAPSULE | Refills: 0 | Status: SHIPPED | OUTPATIENT
Start: 2020-09-19 | End: 2020-09-30

## 2020-09-19 RX ORDER — NITROFURANTOIN 25; 75 MG/1; MG/1
100 CAPSULE ORAL 2 TIMES DAILY
Status: DISCONTINUED | OUTPATIENT
Start: 2020-09-19 | End: 2020-09-19

## 2020-09-19 NOTE — PLAN OF CARE
Problem: Patient/Family Goals  Goal: Patient/Family Long Term Goal  Description: Patient's Long Term Goal: normal blood pressures    Interventions:  -   - See additional Care Plan goals for specific interventions  Outcome: Adequate for Discharge  Goal: P

## 2020-09-19 NOTE — PROGRESS NOTES
Pt given verbal and written discharge instructions and verbalized understanding.   Pt home in no apparent discomfort via wheelchair, accompanied by OBT

## 2020-09-19 NOTE — PROGRESS NOTES
Pt states that she feels much better after eating, drinking and showering. Pt stated that \"I feel like I overreacted about my bladder this morning\"  Pt voids freely, clear yellow urine.

## 2020-09-19 NOTE — PROGRESS NOTES
OB/GYN    Pt without complaints. Pt reports dysuria, frequency. Some dizziness in the shower.     Temp: 98.4 °F (36.9 °C)  Pulse: 96  Resp: 16  BP: 104/65    Intake/Output Summary (Last 24 hours) at 9/19/2020 1304  Last data filed at 9/19/2020 1202  Gross

## 2020-09-19 NOTE — PROGRESS NOTES
Pt given cranberry juice after complaining of \"tingling' of her bladder and urgency. Will address with MD during rounds.

## 2020-09-21 NOTE — PAYOR COMM NOTE
--------------  DISCHARGE REVIEW    Payor: [facility] PPO  Subscriber #:  [id number]  Authorization Number: Aliza Brown    Admit date: 9/17/20  Admit time:  4279  Discharge Date: 9/19/2020  2:17 PM     Admitting Physician: Kingston Gillespie MD  Attending Physic

## 2020-09-22 NOTE — PROGRESS NOTES
Please notify pt that her urine culture confirms she is on the correct antibiotic for her bladder infection.

## 2021-01-29 PROBLEM — Z34.90 PREGNANCY: Status: RESOLVED | Noted: 2020-09-17 | Resolved: 2021-01-29

## 2021-01-29 PROBLEM — O15.00: Status: ACTIVE | Noted: 2021-01-29

## 2021-01-29 PROBLEM — Z34.90 PREGNANCY (HCC): Status: RESOLVED | Noted: 2020-09-17 | Resolved: 2021-01-29

## 2021-01-29 PROBLEM — J30.2 SEASONAL ALLERGIC RHINITIS, UNSPECIFIED TRIGGER: Status: ACTIVE | Noted: 2018-07-13

## 2022-03-22 PROBLEM — O15.00: Status: RESOLVED | Noted: 2021-01-29 | Resolved: 2022-03-22

## 2024-06-03 ENCOUNTER — TELEPHONE (OUTPATIENT)
Dept: FAMILY MEDICINE CLINIC | Facility: CLINIC | Age: 44
End: 2024-06-03

## 2024-06-03 ENCOUNTER — OFFICE VISIT (OUTPATIENT)
Dept: FAMILY MEDICINE CLINIC | Facility: CLINIC | Age: 44
End: 2024-06-03
Payer: COMMERCIAL

## 2024-06-03 VITALS
HEART RATE: 107 BPM | DIASTOLIC BLOOD PRESSURE: 84 MMHG | SYSTOLIC BLOOD PRESSURE: 130 MMHG | RESPIRATION RATE: 20 BRPM | BODY MASS INDEX: 23.64 KG/M2 | WEIGHT: 156 LBS | HEIGHT: 68 IN | OXYGEN SATURATION: 98 %

## 2024-06-03 DIAGNOSIS — Z00.00 ROUTINE GENERAL MEDICAL EXAMINATION AT A HEALTH CARE FACILITY: Primary | ICD-10-CM

## 2024-06-03 DIAGNOSIS — Z63.4 BEREAVEMENT CARE: ICD-10-CM

## 2024-06-03 PROCEDURE — 99386 PREV VISIT NEW AGE 40-64: CPT | Performed by: INTERNAL MEDICINE

## 2024-06-03 SDOH — SOCIAL STABILITY - SOCIAL INSECURITY: DISSAPEARANCE AND DEATH OF FAMILY MEMBER: Z63.4

## 2024-06-03 NOTE — PROGRESS NOTES
HPI:   Suzan Sanchez is a 44 year old female who presents for a well woman exam. Symptoms: denies discharge, itching, burning or dysuria.    No LMP recorded.  Previous pap: 2023 Dr. Swift  Performs SBEs:yes                        Dad passed in March on hospice with End stage COPD.  Mother had a liver transplant, fell and broke both legs. In and out of nursing homes with infections/PICC, moved her to hospice. Passed May 15th.  Requested a personal leave for 1 year from work due to these unforeseen circumstances.  Work couldn't approve it due to being passed the deadline.  They requested she use a FMLA for the first semester  thru Dec 31.  Works for [employer].   Brother  3 years earlier in a car accident.  Trying to care for her 2 children, middle school and 7 year old.     Current Outpatient Medications   Medication Sig Dispense Refill    Norethin Ace-Eth Estrad-FE (LOESTRIN FE ) 1-20 MG-MCG Oral Tab Take 1 tablet by mouth daily. 84 tablet 4    Cholecalciferol (VITAMIN D) 50 MCG (2000 UT) Oral Cap Take by mouth daily.  0    Multiple Vitamins-Minerals (MULTI-VITAMIN/MINERALS) Oral Tab Take 1 tablet by mouth daily.      Probiotic Product (PROBIOTIC OR) Take  by mouth.        Past Medical History:    ACNE    Basal cell carcinoma    Blood disorder    thrombocytopenia    Eclampsia affecting third pregnancy (HCC)    GERD    History of kidney stones    Advise adequate fluid intake.     KIDNEY STONE    Non-seasonal allergic rhinitis, unspecified trigger    Peptic ulcer disease    Pregnancy-induced hypertension (HCC)    Skin moles    moles removed from back- came back almost melanoma      Past Surgical History:   Procedure Laterality Date    D & c  11    Other surgical history  2007    endoscopy    Other surgical history  6-10    mole removed from back- almost melanoma    Tonsillectomy        Family History   Problem Relation Age of Onset    Thyroid Disorder Mother      Lung Disorder Mother     Hypertension Mother     Cancer Mother         lung cancer    Lipids Mother     Other (fibroids) Mother     Other (osteoporosis) Mother     Hypertension Father     Colon Cancer Father     Lipids Father     Heart Disease Father     Other (COPD) Father     Kidney Disease Brother         kidney transplant      Social History:   Social History     Socioeconomic History    Marital status:    Tobacco Use    Smoking status: Former     Current packs/day: 0.00     Types: Cigarettes     Quit date: 1/3/2012     Years since quittin.4    Smokeless tobacco: Never   Vaping Use    Vaping status: Never Used   Substance and Sexual Activity    Alcohol use: Yes     Alcohol/week: 1.0 standard drink of alcohol     Types: 1 Glasses of wine per week     Comment: A few days a week     Drug use: No    Sexual activity: Yes     Partners: Male     Exercise: none.  Diet: doesn't watch     REVIEW OF SYSTEMS:   GENERAL: feels well otherwise  SKIN: denies unusual skin lesions  HEENT:no vision or hearing changes; denies nasal congestion, sinus pain or sore throat  LUNGS: denies shortness of breath, chest heaviness or cough  CV: denies chest pain, pressure or palpitations  GI: denies abdominal pain; denies heartburn, frequent diarrhea or constipation  : denies dysuria, vaginal discharge or itching; denies pelvic pain  MS: denies back pain  NEURO: denies headaches; no dizziness  PSYCH: denies depression or anxiety  HEME: denies hx of anemia  ENDOCRINE: denies thyroid history, denies excessive thirst, denies significant weight change  ALL/ASTHMA: denies hx of  allergy or asthma    EXAM:   /84   Pulse 107   Resp 20   Ht 5' 8\" (1.727 m)   Wt 156 lb (70.8 kg)   SpO2 98%   BMI 23.72 kg/m²       Body mass index is 23.72 kg/m².      GENERAL: pleasant and in appropriate emotional distress  SKIN: warm and dry  HEENT: atraumatic, normocephalic; PERRLA, conjunctiva clear; ears, nose and throat are clear  NECK:  supple,no adenopathy,no thyromegaly  LUNGS: clear to auscultation, easy breathing  CV: normal S1S2, RRR without murmur  GI: BSs present, no tenderness or organomegaly  :no pelvic TTP  MS: Sapna, no bony deformities  EXT: no cyanosis, clubbing or edema  NEURO: A&Ox3, motor and sensory are grossly intact, good coordination; no tremors  PSYCH: tearful, crying when talking about her family events    ASSESSMENT AND PLAN:      1. Routine general medical examination at a health care facility  - sees G[facility], up to date  - Comp Metabolic Panel (14)  - Lipid Panel  - TSH W Reflex To Free T4  - Vitamin B12  - CBC With Differential With Platelet  - Vitamin D, 25-Hydroxy  - Vitamin D; Future    2. Bereavement care  - forms dept to complete  - offered counseling, pt has a therapist she sees currently  - discussed prn meds that may be warranted if sleep or concentration does not improve due to anxiety          Suzan was given an opportunity to ask questions and verbalized understanding of care. Follow up 1 year, sooner if needed.             .

## 2024-06-03 NOTE — TELEPHONE ENCOUNTER
Family Medical Leave Act form received at Forms Department on 6/3/2024. No Release of Information on file - sent patient a Fredio message. Logged for processing.

## 2024-06-04 NOTE — TELEPHONE ENCOUNTER
Forms are completed. Need to obtain hand signature from provider as she is currently not in onbase.

## 2024-06-06 NOTE — TELEPHONE ENCOUNTER
Hand signed Family Medical Leave Act uploaded to patient [employer]. No Authorization received to fax forms.

## 2024-10-11 ENCOUNTER — LAB ENCOUNTER (OUTPATIENT)
Dept: LAB | Age: 44
End: 2024-10-11
Attending: INTERNAL MEDICINE
Payer: COMMERCIAL

## 2024-10-11 DIAGNOSIS — Z00.00 ROUTINE GENERAL MEDICAL EXAMINATION AT A HEALTH CARE FACILITY: ICD-10-CM

## 2024-10-11 LAB
ALBUMIN SERPL-MCNC: 4.7 G/DL (ref 3.2–4.8)
ALBUMIN/GLOB SERPL: 2.1 {RATIO} (ref 1–2)
ALP LIVER SERPL-CCNC: 72 U/L
ALT SERPL-CCNC: 26 U/L
ANION GAP SERPL CALC-SCNC: 4 MMOL/L (ref 0–18)
AST SERPL-CCNC: 26 U/L (ref ?–34)
BASOPHILS # BLD AUTO: 0.01 X10(3) UL (ref 0–0.2)
BASOPHILS NFR BLD AUTO: 0.3 %
BILIRUB SERPL-MCNC: 1.1 MG/DL (ref 0.3–1.2)
BUN BLD-MCNC: 15 MG/DL (ref 9–23)
CALCIUM BLD-MCNC: 10 MG/DL (ref 8.7–10.4)
CHLORIDE SERPL-SCNC: 107 MMOL/L (ref 98–112)
CHOLEST SERPL-MCNC: 151 MG/DL (ref ?–200)
CO2 SERPL-SCNC: 28 MMOL/L (ref 21–32)
CREAT BLD-MCNC: 0.79 MG/DL
EGFRCR SERPLBLD CKD-EPI 2021: 96 ML/MIN/1.73M2 (ref 60–?)
EOSINOPHIL # BLD AUTO: 0.04 X10(3) UL (ref 0–0.7)
EOSINOPHIL NFR BLD AUTO: 1.2 %
ERYTHROCYTE [DISTWIDTH] IN BLOOD BY AUTOMATED COUNT: 11.9 %
FASTING PATIENT LIPID ANSWER: YES
FASTING STATUS PATIENT QL REPORTED: YES
GLOBULIN PLAS-MCNC: 2.2 G/DL (ref 2–3.5)
GLUCOSE BLD-MCNC: 86 MG/DL (ref 70–99)
HCT VFR BLD AUTO: 43.4 %
HDLC SERPL-MCNC: 55 MG/DL (ref 40–59)
HGB BLD-MCNC: 14.8 G/DL
IMM GRANULOCYTES # BLD AUTO: 0.01 X10(3) UL (ref 0–1)
IMM GRANULOCYTES NFR BLD: 0.3 %
LDLC SERPL CALC-MCNC: 84 MG/DL (ref ?–100)
LYMPHOCYTES # BLD AUTO: 0.96 X10(3) UL (ref 1–4)
LYMPHOCYTES NFR BLD AUTO: 28 %
MCH RBC QN AUTO: 32.4 PG (ref 26–34)
MCHC RBC AUTO-ENTMCNC: 34.1 G/DL (ref 31–37)
MCV RBC AUTO: 95 FL
MONOCYTES # BLD AUTO: 0.28 X10(3) UL (ref 0.1–1)
MONOCYTES NFR BLD AUTO: 8.2 %
NEUTROPHILS # BLD AUTO: 2.13 X10 (3) UL (ref 1.5–7.7)
NEUTROPHILS # BLD AUTO: 2.13 X10(3) UL (ref 1.5–7.7)
NEUTROPHILS NFR BLD AUTO: 62 %
NONHDLC SERPL-MCNC: 96 MG/DL (ref ?–130)
OSMOLALITY SERPL CALC.SUM OF ELEC: 288 MOSM/KG (ref 275–295)
PLATELET # BLD AUTO: 133 10(3)UL (ref 150–450)
POTASSIUM SERPL-SCNC: 4.6 MMOL/L (ref 3.5–5.1)
PROT SERPL-MCNC: 6.9 G/DL (ref 5.7–8.2)
RBC # BLD AUTO: 4.57 X10(6)UL
SODIUM SERPL-SCNC: 139 MMOL/L (ref 136–145)
TRIGL SERPL-MCNC: 55 MG/DL (ref 30–149)
TSI SER-ACNC: 2.72 MIU/ML (ref 0.55–4.78)
VIT B12 SERPL-MCNC: 497 PG/ML (ref 211–911)
VIT D+METAB SERPL-MCNC: 58.5 NG/ML (ref 30–100)
VLDLC SERPL CALC-MCNC: 9 MG/DL (ref 0–30)
WBC # BLD AUTO: 3.4 X10(3) UL (ref 4–11)

## 2024-10-11 PROCEDURE — 82607 VITAMIN B-12: CPT | Performed by: INTERNAL MEDICINE

## 2024-10-11 PROCEDURE — 82306 VITAMIN D 25 HYDROXY: CPT

## 2024-10-11 PROCEDURE — 84443 ASSAY THYROID STIM HORMONE: CPT | Performed by: INTERNAL MEDICINE

## 2024-10-11 PROCEDURE — 36415 COLL VENOUS BLD VENIPUNCTURE: CPT

## 2024-10-11 PROCEDURE — 80061 LIPID PANEL: CPT | Performed by: INTERNAL MEDICINE

## 2024-10-11 PROCEDURE — 80053 COMPREHEN METABOLIC PANEL: CPT | Performed by: INTERNAL MEDICINE

## 2024-10-11 PROCEDURE — 85025 COMPLETE CBC W/AUTO DIFF WBC: CPT | Performed by: INTERNAL MEDICINE

## 2024-11-19 ENCOUNTER — TELEMEDICINE (OUTPATIENT)
Dept: FAMILY MEDICINE CLINIC | Facility: CLINIC | Age: 44
End: 2024-11-19
Payer: COMMERCIAL

## 2024-11-19 DIAGNOSIS — D69.6 LOW PLATELET COUNT (HCC): Primary | ICD-10-CM

## 2024-11-19 DIAGNOSIS — D70.9 NEUTROPENIA, UNSPECIFIED TYPE (HCC): ICD-10-CM

## 2024-11-19 PROCEDURE — 99213 OFFICE O/P EST LOW 20 MIN: CPT | Performed by: INTERNAL MEDICINE

## 2024-11-20 NOTE — PROGRESS NOTES
Video Visit  Suzan Sanchez is a 44 year old female.  Chief Complaint   Patient presents with    Lab Results         HPI:   Pt requests a video visit to discuss lab results.  Physical over the summer. Labs done recently with low platelets and WBCs.  Pt states she had neutropenia as a child and was frequently being worked up by her pediatrician. Did not see a specialist.  She recalls in pregnancy knowing that her platelets were low briefly because they were watching it and discussed steroids if it didn't improve on its own.  She otherwise feels well.   Denies excessive or spontaneous bruising.  Denies illness      Current Outpatient Medications   Medication Sig Dispense Refill    Multiple Vitamins-Minerals (MULTI-VITAMIN/MINERALS) Oral Tab Take 1 tablet by mouth daily.      Probiotic Product (PROBIOTIC OR) Take  by mouth.        Past Medical History:    ACNE    Basal cell carcinoma    Blood disorder    thrombocytopenia    Eclampsia affecting third pregnancy (HCC)    GERD    History of kidney stones    Advise adequate fluid intake.     KIDNEY STONE    Non-seasonal allergic rhinitis, unspecified trigger    Peptic ulcer disease    Pregnancy-induced hypertension (HCC)    Skin moles    moles removed from back- came back almost melanoma      Past Surgical History:   Procedure Laterality Date    D & c  11    Other surgical history      endoscopy    Other surgical history  6-10    mole removed from back- almost melanoma    Tonsillectomy        Social History:  Social History     Socioeconomic History    Marital status:    Tobacco Use    Smoking status: Former     Current packs/day: 0.00     Types: Cigarettes     Quit date: 1/3/2012     Years since quittin.8    Smokeless tobacco: Never   Vaping Use    Vaping status: Never Used   Substance and Sexual Activity    Alcohol use: Yes     Alcohol/week: 1.0 standard drink of alcohol     Types: 1 Glasses of wine per week     Comment: A few days a week      Drug use: No    Sexual activity: Yes     Partners: Male        REVIEW OF SYSTEMS:   GENERAL HEALTH: Denies fevers, chills or myalgias  HEENT: Denies ear pain, nasal congestion, sinus pain, or sore throat  SKIN: Denies rash  LUNGS: Denies shortness of breath, wheezing, or cough  CV: Denies chest pain or palpitations; denies lightheadedness  GI: Denies abdominal pain, denies N/V/D  MS: Denies back, neck or joint pain  NEURO: Denies headaches  ALLERGY/ASTHMA: Denies asthma and environmental allergies       EXAM:   GENERAL: well developed, well nourished, NAD.  SKIN: No rash visible  HEENT: AT/NC. Normal lips, gums and teeth; no hyponasal tone on video visit  LUNGS: Speaking in complete sentences comfortably without increased work of breathing; no cough during visit  PSYCH: Normal mood and affect, A&Ox3, affect is consistent with mood    CBC  (most recent labs)   Lab Results   Component Value Date/Time    WBC 3.4 (L) 10/11/2024 09:55 AM    HGB 14.8 10/11/2024 09:55 AM    HCT 43.4 10/11/2024 09:55 AM    .0 (L) 10/11/2024 09:55 AM          ASSESSMENT AND PLAN:        Low platelet count (HCC)         -     repeat lab in 1-2 months         -     Hematology if not better to discuss any necessary monitoring or further screening  -     CBC With Differential With Platelet    Neutropenia, unspecified type (HCC)  -     CBC With Differential With Platelet            The patient indicates understanding of these issues and agrees to the plan.    Follow up June for WWE    Duration of visit: 10 min      Suzan Sanchez understands a video evaluation is not a substitute for face-to-face examination or emergency care. Patient advised to go to ER or call 911 for worsening symptoms or acute distress.   Please note that the following visit was completed using two-way, real-time interactive video communication. This has been done in good natalie to provide continuity of care in the best interest of the provider-patient relationship,  due to the ongoing public health crisis/national emergency and because of restrictions of visitation. There are limitations of this visit as limited physical exam could be performed. Every conscious effort was taken to allow for sufficient and adequate time. This billing was spent on reviewing labs, medications, radiology tests and decision making. Appropriate medical decision-making and tests are ordered as detailed in the plan of care above.

## 2025-01-27 ENCOUNTER — LAB ENCOUNTER (OUTPATIENT)
Dept: LAB | Age: 45
End: 2025-01-27
Attending: INTERNAL MEDICINE
Payer: COMMERCIAL

## 2025-01-27 LAB
BASOPHILS # BLD AUTO: 0.02 X10(3) UL (ref 0–0.2)
BASOPHILS NFR BLD AUTO: 0.6 %
EOSINOPHIL # BLD AUTO: 0.05 X10(3) UL (ref 0–0.7)
EOSINOPHIL NFR BLD AUTO: 1.4 %
ERYTHROCYTE [DISTWIDTH] IN BLOOD BY AUTOMATED COUNT: 12.4 %
HCT VFR BLD AUTO: 45 %
HGB BLD-MCNC: 14.9 G/DL
IMM GRANULOCYTES # BLD AUTO: 0.01 X10(3) UL (ref 0–1)
IMM GRANULOCYTES NFR BLD: 0.3 %
LYMPHOCYTES # BLD AUTO: 0.99 X10(3) UL (ref 1–4)
LYMPHOCYTES NFR BLD AUTO: 27.9 %
MCH RBC QN AUTO: 30.8 PG (ref 26–34)
MCHC RBC AUTO-ENTMCNC: 33.1 G/DL (ref 31–37)
MCV RBC AUTO: 93.2 FL
MONOCYTES # BLD AUTO: 0.3 X10(3) UL (ref 0.1–1)
MONOCYTES NFR BLD AUTO: 8.5 %
NEUTROPHILS # BLD AUTO: 2.18 X10 (3) UL (ref 1.5–7.7)
NEUTROPHILS # BLD AUTO: 2.18 X10(3) UL (ref 1.5–7.7)
NEUTROPHILS NFR BLD AUTO: 61.3 %
PLATELET # BLD AUTO: 130 10(3)UL (ref 150–450)
RBC # BLD AUTO: 4.83 X10(6)UL
WBC # BLD AUTO: 3.6 X10(3) UL (ref 4–11)

## 2025-01-27 PROCEDURE — 85025 COMPLETE CBC W/AUTO DIFF WBC: CPT | Performed by: INTERNAL MEDICINE

## 2025-01-27 PROCEDURE — 36415 COLL VENOUS BLD VENIPUNCTURE: CPT | Performed by: INTERNAL MEDICINE

## 2025-01-30 ENCOUNTER — OFFICE VISIT (OUTPATIENT)
Dept: FAMILY MEDICINE CLINIC | Facility: CLINIC | Age: 45
End: 2025-01-30
Payer: COMMERCIAL

## 2025-01-30 VITALS
BODY MASS INDEX: 23.95 KG/M2 | SYSTOLIC BLOOD PRESSURE: 108 MMHG | OXYGEN SATURATION: 98 % | RESPIRATION RATE: 16 BRPM | WEIGHT: 158 LBS | DIASTOLIC BLOOD PRESSURE: 68 MMHG | HEART RATE: 80 BPM | HEIGHT: 68 IN

## 2025-01-30 DIAGNOSIS — M77.8 RIGHT ELBOW TENDONITIS: Primary | ICD-10-CM

## 2025-01-30 DIAGNOSIS — Z86.19 HISTORY OF SHINGLES: ICD-10-CM

## 2025-01-30 DIAGNOSIS — Z63.4 BEREAVEMENT CARE: ICD-10-CM

## 2025-01-30 DIAGNOSIS — D69.6 THROMBOCYTOPENIA: ICD-10-CM

## 2025-01-30 PROCEDURE — 99214 OFFICE O/P EST MOD 30 MIN: CPT | Performed by: INTERNAL MEDICINE

## 2025-01-30 RX ORDER — PREDNISONE 50 MG/1
TABLET ORAL
Qty: 5 TABLET | Refills: 0 | Status: SHIPPED | OUTPATIENT
Start: 2025-01-30

## 2025-01-30 RX ORDER — IBUPROFEN 800 MG/1
TABLET, FILM COATED ORAL
Qty: 10 TABLET | Refills: 0 | Status: SHIPPED | OUTPATIENT
Start: 2025-01-30

## 2025-01-30 SDOH — SOCIAL STABILITY - SOCIAL INSECURITY: DISSAPEARANCE AND DEATH OF FAMILY MEMBER: Z63.4

## 2025-01-30 NOTE — PROGRESS NOTES
The  [employer] Act makes medical notes like these available to patients in the interest of transparency. Please be advised this is a medical document. Medical documents are intended to carry relevant information, facts as evident, and the clinical opinion of the practitioner. The medical note is intended as peer to peer communication and may appear blunt or direct. It is written in medical language and may contain abbreviations or verbiage that are unfamiliar.       Suzan Sanchez is a 44 year old female.  HPI:   C/o Right elbow pain. No repetition. No injury.     Has been off of work for family reasons. RTW- . No restrictions. Needs letter for work.     Follow up trend of low platelets, low WBC. Feels well. Denies previous history of low platelets. Mother had the same issue with platelets, not worked up.    Shingles history. Has general questions about contagiousness, vaccination.  Current Outpatient Medications   Medication Sig Dispense Refill    Multiple Vitamins-Minerals (MULTI-VITAMIN/MINERALS) Oral Tab Take 1 tablet by mouth daily.      Probiotic Product (PROBIOTIC OR) Take  by mouth.        Past Medical History:    ACNE    Basal cell carcinoma    Blood disorder    thrombocytopenia    Eclampsia affecting third pregnancy (HCC)    GERD    History of kidney stones    Advise adequate fluid intake.     KIDNEY STONE    Non-seasonal allergic rhinitis, unspecified trigger    Peptic ulcer disease    Pregnancy-induced hypertension (HCC)    Skin moles    moles removed from back- came back almost melanoma      Social History:  Social History     Socioeconomic History    Marital status:    Tobacco Use    Smoking status: Former     Current packs/day: 0.00     Types: Cigarettes     Quit date: 1/3/2012     Years since quittin.0    Smokeless tobacco: Never   Vaping Use    Vaping status: Never Used   Substance and Sexual Activity    Alcohol use: Yes     Alcohol/week: 1.0 standard drink of  alcohol     Types: 1 Glasses of wine per week     Comment: A few days a week     Drug use: No    Sexual activity: Yes     Partners: Male        REVIEW OF SYSTEMS:   GENERAL HEALTH: feels well otherwise  SKIN: denies any unusual skin lesions or rashes; denies excessive bruising or bleeding  RESPIRATORY: denies shortness of breath or cough  CARDIOVASCULAR: denies chest pain or pressure  GI: denies N/V/D; denies abdominal pain    : denies blood in urine; no menorrhagia  MS: right hand pain up the arm, elbow pain downward; right hand dominant.  NEURO: denies headaches, denies dizziness; denies numbness or tingling    EXAM:   /68   Pulse 80   Resp 16   Ht 5' 8\" (1.727 m)   Wt 158 lb (71.7 kg)   LMP 01/08/2025   SpO2 98%   BMI 24.02 kg/m²   GENERAL: well developed, well nourished,in no apparent distress  SKIN: warm & dry  HEENT: atraumatic, normocephalic   NECK: supple,no adenopathy   LUNGS: CTA, easy breathing  CV: normal S1S2, RRR without murmur  GI: good BS's,no masses, HSM or tenderness  MS: right medial epicondyle TTP, no forearm TTP; pronation/supination reproduces elbow discomfort  EXT: no cyanosis, clubbing or edema       CBC  (most recent labs)   Lab Results   Component Value Date/Time    WBC 3.6 (L) 01/27/2025 09:46 AM    HGB 14.9 01/27/2025 09:46 AM    HCT 45.0 01/27/2025 09:46 AM    .0 (L) 01/27/2025 09:46 AM           Previous [facility] notes show thrombocytopenia from 2020 with labs as far back as 2013 with plts as low as 113,000    ASSESSMENT AND PLAN:   1. Right elbow tendonitis  - pt education given  - compression sleeve on during the day  - cold compresses as directed  - prednisone as directed, common risks/SEs discussed  - ibuprofen x 5 days as directed    2. Thrombocytopenia, stable  - addressed in 2020, thought to be gestational thrombocytopenia but has not resolved afterwards  - repeat in June  - discussed Hematology consult  - CBC With Differential With Platelet;  Future    3. History of shingles  - recommended Shingrix, common SEs discussed    4. Bereavement Care  - pt is ready to return to work, letter written, no restrictions     The patient indicates understanding of these issues and agrees to the plan.  Follow up if not better.

## 2025-02-12 DIAGNOSIS — M77.8 RIGHT ELBOW TENDONITIS: Primary | ICD-10-CM

## 2025-02-13 ENCOUNTER — TELEPHONE (OUTPATIENT)
Facility: CLINIC | Age: 45
End: 2025-02-13

## 2025-02-13 DIAGNOSIS — M25.521 ELBOW PAIN, RIGHT: Primary | ICD-10-CM

## 2025-02-13 NOTE — TELEPHONE ENCOUNTER
Patient schedule for right arm elbow pain since 11/2024     Future Appointments   Date Time Provider Department Center   2/28/2025  8:00 AM Harjeet Ruffin MD [facility] [address]        Please advise if imaging is needed.

## 2025-02-24 ENCOUNTER — HOSPITAL ENCOUNTER (OUTPATIENT)
Dept: GENERAL RADIOLOGY | Age: 45
Discharge: HOME | End: 2025-02-24
Attending: STUDENT IN AN ORGANIZED HEALTH CARE EDUCATION/TRAINING PROGRAM
Payer: COMMERCIAL

## 2025-02-24 DIAGNOSIS — M25.521 ELBOW PAIN, RIGHT: ICD-10-CM

## 2025-02-24 PROCEDURE — 73080 X-RAY EXAM OF ELBOW: CPT | Performed by: STUDENT IN AN ORGANIZED HEALTH CARE EDUCATION/TRAINING PROGRAM

## 2025-02-28 ENCOUNTER — OFFICE VISIT (OUTPATIENT)
Facility: CLINIC | Age: 45
End: 2025-02-28
Payer: COMMERCIAL

## 2025-02-28 VITALS — BODY MASS INDEX: 23.95 KG/M2 | WEIGHT: 158 LBS | HEIGHT: 68 IN

## 2025-02-28 DIAGNOSIS — M77.11 LATERAL EPICONDYLITIS OF RIGHT ELBOW: Primary | ICD-10-CM

## 2025-02-28 PROCEDURE — 99203 OFFICE O/P NEW LOW 30 MIN: CPT | Performed by: STUDENT IN AN ORGANIZED HEALTH CARE EDUCATION/TRAINING PROGRAM

## 2025-02-28 NOTE — PROGRESS NOTES
Clinic Note     Allergies[1]    CC: right elbow and forearm pain    HPI: This 45 year old RHD female presents with the above. She feels occasional associated dorsal thumb and wrist soreness associated with this pain as well. She was diagnosed with elbow tendinitis recently. She feels discomfort on a daily basis, and with daily activities.     Onset: gradual, no acute trauma  Pain Character: aching, throbbing, sharp  Pain Level: moderate    Treatments Tried: oral steroids, ibuprofen, compression sleeves without significant relief    Occupation: teacher    Review of Systems:  Negative unless stated in HPI.    History/Other:   Past Medical History:    ACNE    Basal cell carcinoma    Blood disorder    thrombocytopenia    Eclampsia affecting third pregnancy (HCC)    GERD    History of kidney stones    Advise adequate fluid intake.     KIDNEY STONE    Non-seasonal allergic rhinitis, unspecified trigger    Peptic ulcer disease    Pregnancy-induced hypertension (HCC)    Skin moles    moles removed from back- came back almost melanoma     Past Surgical History:   Procedure Laterality Date    D & c  12/9/11    Other surgical history  2007    endoscopy    Other surgical history  6-10    mole removed from back- almost melanoma    Tonsillectomy  1998     Current Outpatient Medications   Medication Sig Dispense Refill    predniSONE 50 MG Oral Tab 1 tab po Q am with food x 5 days. (Patient not taking: Reported on 2/28/2025) 5 tablet 0    ibuprofen 800 MG Oral Tab 1 tab po BIDPC x 5 days, then BIDPC prn pain 10 tablet 0    Multiple Vitamins-Minerals (MULTI-VITAMIN/MINERALS) Oral Tab Take 1 tablet by mouth daily.      Probiotic Product (PROBIOTIC OR) Take  by mouth.       Family History   Problem Relation Age of Onset    Thyroid Disorder Mother     Lung Disorder Mother     Hypertension Mother     Cancer Mother         lung cancer    Lipids Mother     Other (fibroids) Mother     Other (osteoporosis) Mother     Hypertension Father      Colon Cancer Father     Lipids Father     Heart Disease Father     Other (COPD) Father     Kidney Disease Brother         kidney transplant     Social History     Occupational History    Not on file   Tobacco Use    Smoking status: Former     Current packs/day: 0.00     Types: Cigarettes     Quit date: 1/3/2012     Years since quittin.1     Passive exposure: Never    Smokeless tobacco: Never   Vaping Use    Vaping status: Never Used   Substance and Sexual Activity    Alcohol use: Yes     Alcohol/week: 1.0 standard drink of alcohol     Types: 1 Glasses of wine per week     Comment: A few days a week     Drug use: No    Sexual activity: Yes     Partners: Male        Physical Exam:     Ht 5' 8\" (1.727 m)   Wt 158 lb (71.7 kg)   LMP 2025   BMI 24.02 kg/m²     Constitutional: NAD. AOx3. Well-developed and Well-nourished.   Psychiatric: Normal mood/ affect/ behavior. Judgment and thought content normal.     Right Upper Extremity:     Inspection    Skin intact. No skin lesions. No obvious mass visualized. No crepitus to ROM.   Palpation    No instability to stress of the medial and lateral collateral ligaments.  No tenderness to palpation at the radiocapitellar joint, radial head, medial epicondylar regions, radial tunnel. Tender to palpation directly over the lateral epicondyle. Biceps and Triceps are palpable and grossly intact.       ROM    ROM is grossly normal including flexion / extension, and pronosupination of the forearm.    Pain with resisted wrist extension.     Neurovascular    Normal sensation in the median, ulnar, and radial nerve distribution. Normal motor function of muscles innervated by median/AIN, ulnar, and radial/PIN nerves.    Normally perfused hand(s).               Diagnostic Studies:  I reviewed the images independently from the professional interpretation, and discussed my interpretation of the pertinent findings with patient/family.    Xrays of Right elbow 4V: On my independent  review of the radiographs, there is no evidence of acute bony abnormality.     Assessment/Plan:  45 year old female with right elbow and forearm pain consistent with lateral epicondylitis.    I reviewed the patient's electronic medical record, including the pertinent office notes, medical/surgical history, medications and images. I specifically reviewed the images noted above, independently and discussed my independent interpretation of these images in combination with the pertinent positive and negative findings in my clinical exam with the patient    right lateral epicondylitis.    The nature of the condition was discussed with the patient today including reviewing the xrays. The risks/benefits, pros/cons and reasonable expectations of treatment options were also discussed today. Education and counseling was given for the above diagnosis. Discussed with the patient that lateral epicondylitis tends to have a very protracted course.   patients may stay symptomatic for up to 2 years. Conservative treatment is the mainstay. Surgical treatment is reserved for patients who fail extensive conservative treatment.  Patient has had inconsistent treatment so far so will start with conservative measures.  We touched upon the option for injections which may provide short-term relief but also carries with it some risks, possibly weakening of the tendon origin. I therefore did not recommend steroid injection but we discussed the option for platelet rich plasma which some of my colleagues offer.  This is unfortunately not approved by most insurances and would be an out-of-pocket cost. This treatment is not associated with an increased risk of tendon degeneration, weakening or rupture.     Immobilization: Will try counter force brace and wrist brace. These are to be worn during activities as well as at nighttime.       Follow Up: 6-8 weeks, ok to cancel if doing well    Harjeet Ruffin MD   Hand, Wrist, & Elbow  Surgery  [email or web address]       [1]   Allergies  Allergen Reactions    Augmentin [Amoxicillin-Pot Clavulanate] NAUSEA AND VOMITING    Biaxin [Clarithromycin] NAUSEA AND VOMITING

## (undated) NOTE — LETTER
01/30/25        RE: Suzan KAPADIA Daniel          To Whom It May Concern:        Suzan Sanchez has been seen and is cleared to return to work 7/1/25.  There are no restrictions.      Feel free to call with any questions,            Nisha Montaño  Family Nurse Practitioner  [facility]

## (undated) NOTE — LETTER
Dear new mom:    We've missed you! The nurses of [facility] have tried to reach you by phone to ask if you had any questions regarding your health or the care of your new little one.     Please feel free to call your doctor with an